# Patient Record
Sex: MALE | Race: WHITE | NOT HISPANIC OR LATINO | Employment: FULL TIME | URBAN - NONMETROPOLITAN AREA
[De-identification: names, ages, dates, MRNs, and addresses within clinical notes are randomized per-mention and may not be internally consistent; named-entity substitution may affect disease eponyms.]

---

## 2021-04-12 ENCOUNTER — APPOINTMENT (EMERGENCY)
Dept: CT IMAGING | Facility: HOSPITAL | Age: 56
End: 2021-04-12
Payer: COMMERCIAL

## 2021-04-12 ENCOUNTER — HOSPITAL ENCOUNTER (EMERGENCY)
Facility: HOSPITAL | Age: 56
Discharge: HOME | End: 2021-04-12
Attending: EMERGENCY MEDICINE | Admitting: EMERGENCY MEDICINE
Payer: COMMERCIAL

## 2021-04-12 VITALS
BODY MASS INDEX: 19.2 KG/M2 | TEMPERATURE: 97.3 F | OXYGEN SATURATION: 99 % | HEART RATE: 85 BPM | WEIGHT: 119.49 LBS | DIASTOLIC BLOOD PRESSURE: 68 MMHG | RESPIRATION RATE: 16 BRPM | SYSTOLIC BLOOD PRESSURE: 146 MMHG | HEIGHT: 66 IN

## 2021-04-12 DIAGNOSIS — D58.2 ELEVATED HEMOGLOBIN (HCC): ICD-10-CM

## 2021-04-12 DIAGNOSIS — L03.211 FACIAL CELLULITIS: ICD-10-CM

## 2021-04-12 DIAGNOSIS — K04.7 DENTAL ABSCESS: Primary | ICD-10-CM

## 2021-04-12 DIAGNOSIS — K02.9 DENTAL CARIES: ICD-10-CM

## 2021-04-12 LAB
ALBUMIN SERPL BCP-MCNC: 4.1 G/DL (ref 3.5–5)
ALP SERPL-CCNC: 126 U/L (ref 46–116)
ALT SERPL W P-5'-P-CCNC: 27 U/L (ref 12–78)
ANION GAP SERPL CALCULATED.3IONS-SCNC: 11 MMOL/L (ref 4–13)
AST SERPL W P-5'-P-CCNC: 25 U/L (ref 5–45)
BASOPHILS # BLD AUTO: 0.11 THOUSANDS/ΜL (ref 0–0.1)
BASOPHILS NFR BLD AUTO: 1 % (ref 0–1)
BILIRUB SERPL-MCNC: 1.01 MG/DL (ref 0.2–1)
BUN SERPL-MCNC: 13 MG/DL (ref 5–25)
CALCIUM SERPL-MCNC: 9.9 MG/DL (ref 8.3–10.1)
CHLORIDE SERPL-SCNC: 97 MMOL/L (ref 100–108)
CO2 SERPL-SCNC: 26 MMOL/L (ref 21–32)
CREAT SERPL-MCNC: 1.19 MG/DL (ref 0.6–1.3)
EOSINOPHIL # BLD AUTO: 0.05 THOUSAND/ΜL (ref 0–0.61)
EOSINOPHIL NFR BLD AUTO: 0 % (ref 0–6)
ERYTHROCYTE [DISTWIDTH] IN BLOOD BY AUTOMATED COUNT: 13.2 % (ref 11.6–15.1)
GFR SERPL CREATININE-BSD FRML MDRD: 68 ML/MIN/1.73SQ M
GLUCOSE SERPL-MCNC: 88 MG/DL (ref 65–140)
HCT VFR BLD AUTO: 54.3 % (ref 36.5–49.3)
HGB BLD-MCNC: 18.8 G/DL (ref 12–17)
IMM GRANULOCYTES # BLD AUTO: 0.04 THOUSAND/UL (ref 0–0.2)
IMM GRANULOCYTES NFR BLD AUTO: 0 % (ref 0–2)
LYMPHOCYTES # BLD AUTO: 1.88 THOUSANDS/ΜL (ref 0.6–4.47)
LYMPHOCYTES NFR BLD AUTO: 14 % (ref 14–44)
MCH RBC QN AUTO: 34.7 PG (ref 26.8–34.3)
MCHC RBC AUTO-ENTMCNC: 34.6 G/DL (ref 31.4–37.4)
MCV RBC AUTO: 100 FL (ref 82–98)
MONOCYTES # BLD AUTO: 0.8 THOUSAND/ΜL (ref 0.17–1.22)
MONOCYTES NFR BLD AUTO: 6 % (ref 4–12)
NEUTROPHILS # BLD AUTO: 10.89 THOUSANDS/ΜL (ref 1.85–7.62)
NEUTS SEG NFR BLD AUTO: 79 % (ref 43–75)
NRBC BLD AUTO-RTO: 0 /100 WBCS
PLATELET # BLD AUTO: 173 THOUSANDS/UL (ref 149–390)
PMV BLD AUTO: 9.5 FL (ref 8.9–12.7)
POTASSIUM SERPL-SCNC: 3.8 MMOL/L (ref 3.5–5.3)
PROT SERPL-MCNC: 8 G/DL (ref 6.4–8.2)
RBC # BLD AUTO: 5.42 MILLION/UL (ref 3.88–5.62)
SODIUM SERPL-SCNC: 134 MMOL/L (ref 136–145)
WBC # BLD AUTO: 13.77 THOUSAND/UL (ref 4.31–10.16)

## 2021-04-12 PROCEDURE — 36415 COLL VENOUS BLD VENIPUNCTURE: CPT | Performed by: EMERGENCY MEDICINE

## 2021-04-12 PROCEDURE — 99284 EMERGENCY DEPT VISIT MOD MDM: CPT | Performed by: EMERGENCY MEDICINE

## 2021-04-12 PROCEDURE — 99284 EMERGENCY DEPT VISIT MOD MDM: CPT

## 2021-04-12 PROCEDURE — 70487 CT MAXILLOFACIAL W/DYE: CPT

## 2021-04-12 PROCEDURE — 85025 COMPLETE CBC W/AUTO DIFF WBC: CPT | Performed by: EMERGENCY MEDICINE

## 2021-04-12 PROCEDURE — 80053 COMPREHEN METABOLIC PANEL: CPT | Performed by: EMERGENCY MEDICINE

## 2021-04-12 RX ORDER — CHLORHEXIDINE GLUCONATE 0.12 MG/ML
15 RINSE ORAL 2 TIMES DAILY
Qty: 473 ML | Refills: 1 | Status: SHIPPED | OUTPATIENT
Start: 2021-04-12

## 2021-04-12 RX ORDER — CLINDAMYCIN HYDROCHLORIDE 150 MG/1
300 CAPSULE ORAL ONCE
Status: COMPLETED | OUTPATIENT
Start: 2021-04-12 | End: 2021-04-12

## 2021-04-12 RX ORDER — CLINDAMYCIN HYDROCHLORIDE 300 MG/1
300 CAPSULE ORAL 4 TIMES DAILY
Qty: 28 CAPSULE | Refills: 0 | Status: SHIPPED | OUTPATIENT
Start: 2021-04-12 | End: 2021-04-19

## 2021-04-12 RX ORDER — LIDOCAINE HYDROCHLORIDE AND EPINEPHRINE 10; 10 MG/ML; UG/ML
10 INJECTION, SOLUTION INFILTRATION; PERINEURAL ONCE
Status: COMPLETED | OUTPATIENT
Start: 2021-04-12 | End: 2021-04-12

## 2021-04-12 RX ADMIN — CLINDAMYCIN HYDROCHLORIDE 300 MG: 150 CAPSULE ORAL at 08:58

## 2021-04-12 RX ADMIN — LIDOCAINE HYDROCHLORIDE,EPINEPHRINE BITARTRATE 10 ML: 10; .01 INJECTION, SOLUTION INFILTRATION; PERINEURAL at 08:44

## 2021-04-12 RX ADMIN — IOHEXOL 100 ML: 350 INJECTION, SOLUTION INTRAVENOUS at 07:48

## 2021-04-12 NOTE — ED CARE HANDOFF
Emergency Department Sign Out Note        Sign out and transfer of care from Dr Ava Henderson  See Separate Emergency Department note  The patient, Lea Vela, was evaluated by Dr Ava Henderson for left-sided facial swelling, erythema, and pain  Workup Completed:  No workup completed at the time of sign-out  ED Course / Workup Pending (followup):  CBC, CMP, and CT facial bones with contrast pending  ED Course as of Apr 12 0913   Mon Apr 12, 2021   0718 WBC(!): 13 77   0718 Unclear baseline  Hemoglobin(!): 18 8   0757 Updated patient regarding lab results  He did admit to smoking around a pack of cigarettes per day but was not aware having elevated hemoglobin in the past   Recommended PCP follow-up and repeat labs in a few weeks  8255 Reviewed CT  There appears to be in odontogenic abscess in the left maxillary region  Patient denied any oral pain  I did examine the patient's teeth  There was swelling around the left maxillary premolars suggestive of dental abscess  Dentition was poor with some dental caries noted  Will wait for formal Radiology read  The patient may need an incision and drainage  2488 Odontogenic soft tissue abscess along the buccal surface of diseased left maxillary canine and first premolar teeth measuring 1 8 x 0 7 x 2 1 cm  Overlying left facial cellulitis involving left orbit preseptal soft tissue  No post septal   inflammation  CT facial bones with contrast   1562 Updated patient regarding CT findings  Explained plan to drain the dental abscess  Patient reported an allergy to penicillin but was not able to recall what this allergy was  He stated that happened when he was a child  Out of abundance of caution, will start the patient on clindamycin instead of Augmentin  He will need close outpatient follow-up with his PCP and dentist       0957 Performed incision and drainage of the dental abscess    Moderate amount of purulent material was expressed  Swelling of the periapical region improved significantly, nearly resolved  Had a long discussion with the patient regarding his diagnosis and the need for follow-up with both his PCP and a dentist as soon as possible  Emphasized the importance of taking the antibiotics as prescribed  Discussed return precautions with the patient at length  He verbalized understanding of all instructions  Incision and drain    Date/Time: 4/12/2021 8:54 AM  Performed by: Mechelle Dalal MD  Authorized by: Mechelle Dalal MD   Universal Protocol:  Consent: Verbal consent obtained  Risks and benefits: risks, benefits and alternatives were discussed  Consent given by: patient  Time out: Immediately prior to procedure a "time out" was called to verify the correct patient, procedure, equipment, support staff and site/side marked as required  Patient understanding: patient states understanding of the procedure being performed  Patient consent: the patient's understanding of the procedure matches consent given  Procedure consent: procedure consent matches procedure scheduled  Site marked: the operative site was marked  Radiology Images displayed and confirmed  If images not available, report reviewed: imaging studies available  Patient identity confirmed: verbally with patient and arm band      Patient location:  ED  Location:     Type:  Abscess    Size:  1 8 x 0 7 x 2 1 cm    Location:  Mouth    Location Detail:  Intraoral  Anesthesia (see MAR for exact dosages): Anesthesia method:  Local infiltration    Local anesthetic:  Lidocaine 1% WITH epi  Procedure details:     Complexity:  Simple    Needle aspiration: no      Incision types:  Single straight    Scalpel blade:  10    Approach:  Open    Incision depth:  Submucosal    Wound management:  Probed and deloculated    Drainage:  Purulent    Drainage amount:   Moderate    Wound treatment:  Wound left open    Packing materials: None  Post-procedure details:     Patient tolerance of procedure: Tolerated well, no immediate complications      MDM  Number of Diagnoses or Management Options  Dental abscess: new and requires workup  Dental caries: established and worsening  Elevated hemoglobin ([address] ): new and requires workup  Facial cellulitis: new and requires workup  Diagnosis management comments:     Patient seen and examined immediately after sign out  Briefly, patient is a 59-year-old male with a documented past medical history of peripheral arterial disease, hypertension, and hyperlipidemia  There are few medical records available in the EMR  Patient reported that he noticed some swelling of the left side of his face yesterday which progressively worsened  When he woke up today, he noted swelling of his face involving the left lower eyelid and left upper cheek  There was associated erythema and pain  Patient decided to seek evaluation at that time  Patient reported discomfort and swelling only in the above-noted area  He denied any swelling of the rest of his face or neck  No vision changes such as blurred vision, double vision, or loss of vision  Patient denied any ocular pain or a foreign body sensation in the left eye  No fever, chills, URI symptoms, cough, chest pain, or shortness of breath  No dental pain reported  On examination, the patient had swelling of the left lower eyelid and left upper cheek  There was associated erythema and tenderness to palpation  No crepitus  Patient had normal appearing conjunctiva on the left  Both pupils were equal and reactive to light  Extraocular movements or intact  Patient had no pain with extraocular movements or ophthalmoplegia  No proptosis  History and physical examination most consistent with periorbital cellulitis or odontogenic facial cellulitis  No findings to suggest orbital cellulitis  Dr Akhil Eavns had ordered CBC, CMP, and CT facial bones    CBC demonstrated mild leukocytosis, nonspecific  Hemoglobin was elevated 18 8 but the patient had no prior CBCs available for comparison, so it is not clear whether this represents his baseline  It should be worked up further as an outpatient  CT was personally reviewed  Demonstrated an abscess immediately adjacent to the left maxillary incisors and premolars, concerning for odontogenic abscess with osteogenic facial cellulitis  Although the patient denied dental pain, I examined his mouth and he did have swelling adjacent to the left maxillary incisors in premolars consistent with periapical abscess  Radiology read agreed with my interpretation  Performed incision and drainage as documented above  Patient tolerated this well  Significant purulent drainage was obtained  The periapical swelling had nearly completely resolved after incision and drainage  Patient was started on clindamycin  He was prescribed a 1 week course  He was also prescribed Peridex mouthwash  Patient was advised to follow up with his PCP within 1 week for a recheck  He was also told to follow-up regarding the elevated hemoglobin  Patient was also given the information for multiple local dental clinics and told to follow-up with a dentist as soon as possible for further evaluation and treatment of his significant dental disease  The patient was given detailed return precautions both verbally and in writing  All instructions were personally reviewed with the patient at length  I answered all the patient's questions  He verbalized understanding of all the instructions provided as well as the return precautions provided         Amount and/or Complexity of Data Reviewed  Clinical lab tests: reviewed  Tests in the radiology section of CPT®: reviewed  Decide to obtain previous medical records or to obtain history from someone other than the patient: yes  Review and summarize past medical records: yes  Discuss the patient with other providers: yes  Independent visualization of images, tracings, or specimens: yes    Risk of Complications, Morbidity, and/or Mortality  Presenting problems: moderate  Diagnostic procedures: minimal  Management options: minimal    Patient Progress  Patient progress: improved      Disposition  Final diagnoses:   None     ED Disposition     None      Follow-up Information    None       Patient's Medications    No medications on file     No discharge procedures on file         ED Provider  Electronically Signed by     Milena Collins MD  04/12/21 4638

## 2021-04-12 NOTE — Clinical Note
Angeles Nash was seen and treated in our emergency department on 4/12/2021  Other - See Comments    No limitations  Diagnosis: Not included due to HIPAA  Nidia Berumen  may return to work on return date  He may return on this date: 04/14/2021    Please excuse Nidia Berumen for any necessary follow-up appointments  I recommended that he follow up with his PCP within 1 week and a dentist as soon as possible  Call with any questions  If you have any questions or concerns, please don't hesitate to call        Omid Garcia MD    ______________________________           _______________          _______________  Hospital Representative                              Date                                Time

## 2021-04-12 NOTE — ED PROVIDER NOTES
History  Chief Complaint   Patient presents with    Facial Swelling     L-side of face swelling, onset yesterday afternoon  HPI  51-year-old male comes in with left sided I swelling and facial swelling  Patient states that yesterday afternoon, he started with some swelling of his left thigh  He denies any trauma  Then he went to bed  He has had progressive pain in his left face  Then when he woke up today, the left side his face was swollen  He denies any trismus, he denies any neck pain denies any trouble swallowing  Denies any vision changes, he has pain around his left eye but denies any pain with movement of his left eye  States has never happened before  Denies any new drugs or foods  Denies any insect bites  Denies any history of diabetes  None       Past Medical History:   Diagnosis Date    Gastroesophageal reflux     Hyperlipidemia     last assessed: 2/27/2019    Hypertension     last assessed: 2/27/2019    [facility] )     last assessed: 2/27/2019       History reviewed  No pertinent surgical history  History reviewed  No pertinent family history  I have reviewed and agree with the history as documented  E-Cigarette/Vaping    E-Cigarette Use Never User      E-Cigarette/Vaping Substances    Nicotine No     Flavoring No      Social History     Tobacco Use    Smoking status: Current Every Day Smoker     Packs/day: 1 00    Smokeless tobacco: Never Used   Substance Use Topics    Alcohol use: Yes     Frequency: 4 or more times a week     Drinks per session: 1 or 2    Drug use: Never       Review of Systems   Constitutional: Negative  Negative for chills and fever  HENT: Positive for facial swelling  Negative for ear pain and sore throat  Eyes: Negative  Negative for pain and discharge  Respiratory: Negative  Negative for chest tightness and shortness of breath  Cardiovascular: Negative  Negative for chest pain and palpitations  Gastrointestinal: Negative  Negative for abdominal pain, nausea and vomiting  Endocrine: Negative  Negative for polyphagia and polyuria  Genitourinary: Negative  Negative for dysuria and flank pain  Musculoskeletal: Negative  Negative for arthralgias and back pain  Skin: Negative  Negative for color change and wound  Allergic/Immunologic: Negative  Negative for food allergies and immunocompromised state  Neurological: Negative  Negative for weakness and headaches  Hematological: Negative  Negative for adenopathy  Does not bruise/bleed easily  Psychiatric/Behavioral: Negative  Negative for suicidal ideas  The patient is not nervous/anxious  Physical Exam  Physical Exam  Vitals signs and nursing note reviewed  Constitutional:       General: He is not in acute distress  Appearance: He is well-developed  He is not diaphoretic  HENT:      Head: Normocephalic and atraumatic  Right Ear: External ear normal       Left Ear: External ear normal    Eyes:      General: No scleral icterus  Right eye: No discharge  Left eye: No discharge  Extraocular Movements: Extraocular movements intact  Conjunctiva/sclera: Conjunctivae normal       Pupils: Pupils are equal, round, and reactive to light  Comments: Left eye swollen, pupils equal round reactive to life, no proptosis, he has no pain with extraocular movements  Neck:      Musculoskeletal: Normal range of motion and neck supple  Thyroid: No thyromegaly  Trachea: No tracheal deviation  Cardiovascular:      Rate and Rhythm: Normal rate and regular rhythm  Heart sounds: No murmur  No friction rub  No gallop  Pulmonary:      Effort: Pulmonary effort is normal  No respiratory distress  Breath sounds: Normal breath sounds  No stridor  No wheezing or rales  Abdominal:      General: Bowel sounds are normal  There is no distension  Palpations: Abdomen is soft        Tenderness: There is no abdominal tenderness  There is no guarding or rebound  Musculoskeletal: Normal range of motion  General: No deformity  Skin:     General: Skin is warm and dry  Findings: No erythema or rash  Neurological:      Mental Status: He is alert and oriented to person, place, and time  Cranial Nerves: No cranial nerve deficit  Psychiatric:         Behavior: Behavior normal          Thought Content:  Thought content normal          Vital Signs  ED Triage Vitals   Temperature Pulse Respirations Blood Pressure SpO2   04/12/21 0638 04/12/21 0638 04/12/21 0638 04/12/21 0638 04/12/21 0638   (!) 97 3 °F (36 3 °C) 75 18 139/91 98 %      Temp Source Heart Rate Source Patient Position - Orthostatic VS BP Location FiO2 (%)   04/12/21 0638 04/12/21 0638 04/12/21 0638 04/12/21 0638 --   Temporal Monitor Sitting Left arm       Pain Score       04/12/21 0831       No Pain           Vitals:    04/12/21 0638 04/12/21 0730 04/12/21 0831 04/12/21 0907   BP: 139/91 131/87 142/78 146/68   Pulse: 75 86 84 85   Patient Position - Orthostatic VS: Sitting Sitting Sitting Sitting         Visual Acuity      ED Medications  Medications   iohexol (OMNIPAQUE) 350 MG/ML injection (SINGLE-DOSE) 100 mL (100 mL Intravenous Given 4/12/21 0748)   lidocaine-epinephrine (XYLOCAINE/EPINEPHRINE) 1 %-1:100,000 injection 10 mL (10 mL Infiltration Given by Other 4/12/21 0844)   clindamycin (CLEOCIN) capsule 300 mg (300 mg Oral Given 4/12/21 0858)       Diagnostic Studies  Results Reviewed     Procedure Component Value Units Date/Time    Comprehensive metabolic panel [[id number]]  (Abnormal) Collected: 04/12/21 0703    Lab Status: Final result Specimen: Blood from Arm, Right Updated: 04/12/21 0725     Sodium 134 mmol/L      Potassium 3 8 mmol/L      Chloride 97 mmol/L      CO2 26 mmol/L      ANION GAP 11 mmol/L      BUN 13 mg/dL      Creatinine 1 19 mg/dL      Glucose 88 mg/dL      Calcium 9 9 mg/dL      AST 25 U/L      ALT 27 U/L Alkaline Phosphatase 126 U/L      Total Protein 8 0 g/dL      Albumin 4 1 g/dL      Total Bilirubin 1 01 mg/dL      eGFR 68 ml/min/1 73sq m     Narrative:      [place] guidelines for Chronic Kidney Disease (CKD):     Stage 1 with normal or high GFR (GFR > 90 mL/min/1 73 square meters)    Stage 2 Mild CKD (GFR = 60-89 mL/min/1 73 square meters)    Stage 3A Moderate CKD (GFR = 45-59 mL/min/1 73 square meters)    Stage 3B Moderate CKD (GFR = 30-44 mL/min/1 73 square meters)    Stage 4 Severe CKD (GFR = 15-29 mL/min/1 73 square meters)    Stage 5 End Stage CKD (GFR <15 mL/min/1 73 square meters)  Note: GFR calculation is accurate only with a steady state creatinine    CBC and differential [[id number]]  (Abnormal) Collected: 04/12/21 0703    Lab Status: Final result Specimen: Blood from Arm, Right Updated: 04/12/21 0709     WBC 13 77 Thousand/uL      RBC 5 42 Million/uL      Hemoglobin 18 8 g/dL      Hematocrit 54 3 %       fL      MCH 34 7 pg      MCHC 34 6 g/dL      RDW 13 2 %      MPV 9 5 fL      Platelets 790 Thousands/uL      nRBC 0 /100 WBCs      Neutrophils Relative 79 %      Immat GRANS % 0 %      Lymphocytes Relative 14 %      Monocytes Relative 6 %      Eosinophils Relative 0 %      Basophils Relative 1 %      Neutrophils Absolute 10 89 Thousands/µL      Immature Grans Absolute 0 04 Thousand/uL      Lymphocytes Absolute 1 88 Thousands/µL      Monocytes Absolute 0 80 Thousand/µL      Eosinophils Absolute 0 05 Thousand/µL      Basophils Absolute 0 11 Thousands/µL                  CT facial bones with contrast   Final Result by Babar Her MD (04/12 0820)      1  Odontogenic soft tissue abscess along the buccal surface of diseased left maxillary canine and first premolar teeth measuring 1 8 x 0 7 x 2 1 cm  Overlying left facial cellulitis involving left orbit preseptal soft tissue  No post septal    inflammation        2   Multiple carious teeth with periapical lucencies/abscess of bilateral maxillary first premolar teeth  Clinical assessment is advised  The study was marked in [facility] for immediate notification  Workstation performed: [id number]                    Procedures  Procedures         ED Course                             SBIRT 22yo+      Most Recent Value   SBIRT (22 yo +)   In order to provide better care to our patients, we are screening all of our patients for alcohol and drug use  Would it be okay to ask you these screening questions? Yes Filed at: 04/12/2021 5460   Initial Alcohol Screen: US AUDIT-C    1  How often do you have a drink containing alcohol?  0 Filed at: 04/12/2021 0709   2  How many drinks containing alcohol do you have on a typical day you are drinking? 0 Filed at: 04/12/2021 0709   3a  Male UNDER 65: How often do you have five or more drinks on one occasion? 0 Filed at: 04/12/2021 0709   Audit-C Score  0 Filed at: 04/12/2021 9090   ROYAL: How many times in the past year have you    Used an illegal drug or used a prescription medication for non-medical reasons? Never Filed at: 04/12/2021 0709                    [facility]  Number of Diagnoses or Management Options  Diagnosis management comments: 80-year-old man comes in with progressive worsening facial swelling  No evidence of airway involvement  He is having pain with this swelling, concern for infection  Reassuring that he does not have proptosis or pain with extraocular movement  Given the progressive nature of the swelling, will get a CT of his face  Likely discharge with antibiotics        Disposition  Final diagnoses:   Dental abscess   Facial cellulitis   Dental caries   Elevated hemoglobin ([facility] [address] )     Time reflects when diagnosis was documented in both MDM as applicable and the Disposition within this note     Time User Action Codes Description Comment    4/12/2021  8:56 AM Darshan Eagle Add [K04 7] Dental abscess     4/12/2021  8:56 AM Daniela Vera Massimo Dayana Add [A93 371] Facial cellulitis     4/12/2021  8:57 AM Candice Armstrong Add [K02 9] Dental caries     4/12/2021  8:58 AM Candice Armstrong Add [D58 2] Elevated hemoglobin [facility])       ED Disposition     ED Disposition Condition Date/Time Comment    Discharge Good Mon Apr 12, 2021  8:56 AM Israel Williamson discharge to home/self care  Follow-up Information     Follow up With Specialties Details Why Contact Info Additional Information    John Ceron DO Family Medicine Call in 1 day Follow-up with your PCP in 1 week for a recheck  Also get repeat labs due to the elevated hemoglobin  [address]       [facility] Emergency Department Emergency Medicine Go to  If symptoms worsen  [place] [id number]  [facility] Emergency Department, [address]  Call today Please call and follow up with a dentist as soon as possible  [address] [phone]   Call today Please call and follow-up with a dentist as soon as possible  [address] [place] [address]  [phone]           Discharge Medication List as of 4/12/2021  9:04 AM      START taking these medications    Details   chlorhexidine (PERIDEX) 0 12 % solution Apply 15 mL to the mouth or throat 2 (two) times a day, Starting Mon 4/12/2021, Normal      clindamycin (CLEOCIN) 300 MG capsule Take 1 capsule (300 mg total) by mouth 4 (four) times a day for 7 days, Starting Mon 4/12/2021, Until Mon 4/19/2021, Normal           No discharge procedures on file      PDMP Review     None          ED Provider  Electronically Signed by           Michael Denney MD  04/12/21 1334

## 2021-04-12 NOTE — DISCHARGE INSTRUCTIONS
As we discussed, the swelling and pain of your face were due to a dental abscess  This abscess began to cause cellulitis (a skin and soft tissue infection) of the face  I drained the dental abscess and your symptoms should start to get better after 1-2 days of antibiotics  Follow-up with your PCP within 1 week  You should get a recheck of the facial swelling and infection  You should also discuss getting repeat labs due to your elevated hemoglobin  It is very important that you follow-up with a dentist as soon as possible  Start calling dentists today  You have numerous cavities and the dental abscess indicates significant disease of your teeth  Refer to the list of dental clinics I provided  I also gave you the information for [address] [place] [address] dental clinics as well  Take clindamycin as prescribed  Use the Peridex mouthwash as prescribed  Closely monitor your symptoms at home  Return to the ER with fever, shaking chills, blurred vision, double vision, loss of vision in your left eye, rapidly worsening facial swelling, worsening or unchanged facial swelling or redness after 2 full days of antibiotics, difficulty opening your mouth, difficulty swallowing, swelling underneath your jawline or chin, difficulty breathing, neck swelling, or any other concerning symptoms

## 2021-04-12 NOTE — ED NOTES
L-cheek, below left eye swollen  Denies pain of discomfort to these areas        Haley Mendosa RN  04/12/21 [facility], RN  04/12/21 5522

## 2021-04-12 NOTE — Clinical Note
Janineaustin Catalina was seen and treated in our emergency department on 4/12/2021  Other - See Comments    No limitations  Diagnosis: Not included due to HIPAA  Liseth Cabrera  may return to work on return date  He may return on this date: 04/14/2021    Please excuse Liseth Cabrera for any necessary follow-up appointments  I recommended that he follow up with his PCP within 1 week and a dentist as soon as possible  Call with any questions  If you have any questions or concerns, please don't hesitate to call        Clemente Couch MD    ______________________________           _______________          _______________  Hospital Representative                              Date                                Time

## 2021-04-21 ENCOUNTER — OFFICE VISIT (OUTPATIENT)
Dept: FAMILY MEDICINE CLINIC | Facility: CLINIC | Age: 56
End: 2021-04-21
Payer: COMMERCIAL

## 2021-04-21 VITALS
HEART RATE: 78 BPM | DIASTOLIC BLOOD PRESSURE: 82 MMHG | SYSTOLIC BLOOD PRESSURE: 140 MMHG | TEMPERATURE: 97 F | BODY MASS INDEX: 18.71 KG/M2 | OXYGEN SATURATION: 99 % | HEIGHT: 66 IN | WEIGHT: 116.4 LBS

## 2021-04-21 DIAGNOSIS — E78.5 DYSLIPIDEMIA: ICD-10-CM

## 2021-04-21 DIAGNOSIS — Z12.5 PROSTATE CANCER SCREENING: ICD-10-CM

## 2021-04-21 DIAGNOSIS — Z11.59 ENCOUNTER FOR HEPATITIS C SCREENING TEST FOR LOW RISK PATIENT: ICD-10-CM

## 2021-04-21 DIAGNOSIS — K04.7 DENTAL ABSCESS: ICD-10-CM

## 2021-04-21 DIAGNOSIS — Z00.00 ANNUAL PHYSICAL EXAM: Primary | ICD-10-CM

## 2021-04-21 DIAGNOSIS — Z12.11 COLON CANCER SCREENING: ICD-10-CM

## 2021-04-21 PROCEDURE — 3008F BODY MASS INDEX DOCD: CPT | Performed by: FAMILY MEDICINE

## 2021-04-21 PROCEDURE — 3725F SCREEN DEPRESSION PERFORMED: CPT | Performed by: FAMILY MEDICINE

## 2021-04-21 PROCEDURE — 99396 PREV VISIT EST AGE 40-64: CPT | Performed by: FAMILY MEDICINE

## 2021-04-21 RX ORDER — ASPIRIN 325 MG
325 TABLET ORAL DAILY
COMMUNITY

## 2021-04-21 RX ORDER — ROSUVASTATIN CALCIUM 20 MG/1
20 TABLET, COATED ORAL DAILY
Qty: 90 TABLET | Refills: 3 | Status: SHIPPED | OUTPATIENT
Start: 2021-04-21

## 2021-04-21 NOTE — PATIENT INSTRUCTIONS
Cigarette Smoking and Your Health   AMBULATORY CARE:   Risks to your health if you smoke:  Nicotine and other chemicals found in tobacco and e-cigarettes can damage every cell in your body  Even if you are a light smoker, you have an increased risk for cancer, heart disease, and lung disease  If you are pregnant or have diabetes, smoking increases your risk for complications  Nicotine can affect an adolescent's developing brain  This can lead to trouble thinking, learning, or paying attention  Benefits to your health if you stop smoking:   · You decrease respiratory symptoms such as coughing, wheezing, and shortness of breath  · You reduce your risk for cancers of the lung, mouth, throat, kidney, bladder, pancreas, stomach, and cervix  If you already have cancer, you increase the benefits of chemotherapy  You also reduce your risk for cancer returning or a second cancer from developing  · You reduce your risk for heart disease, blood clots, heart attack, and stroke  · You reduce your risk for lung infections, and diseases such as pneumonia, asthma, chronic bronchitis, and emphysema  · Your circulation improves  More oxygen can be delivered to your body  If you have diabetes, you lower your risk for complications, such as kidney, artery, and eye diseases  You also lower your risk for nerve damage  Nerve damage can lead to amputations, poor vision, and blindness  · You improve your body's ability to heal and to fight infections  · An adolescent can help his or her brain and body develop in a healthy way  Talk to your adolescent about all the health risks of nicotine  If you can, start talking about nicotine when your child is younger than 12 years  This may make it easier for him or her not to start using nicotine as a teenager or adult  Explain to him or her that it is best never to start  It can be hard to try to quit later      Benefits to the health of others if you stop smoking:  Tobacco is harmful to nonsmokers who breathe in your secondhand smoke  The following are ways the health of others around you may improve when you stop smoking:  · You lower the risks for lung cancer and heart disease in nonsmoking adults  · If you are pregnant, you lower the risk for miscarriage, early delivery, low birth weight, and stillbirth  You also lower your baby's risk for SIDS, obesity, developmental delay, and neurobehavioral problems, such as ADHD  · If you have children, you lower their risk for ear infections, colds, pneumonia, bronchitis, and asthma  Follow up with your doctor as directed:  Write down your questions so you remember to ask them during your visits  For more information and support to stop smoking:   · [place]  Phone: 7- 973 - 259-9474  Web Address: www [employer]  Debi [address] Information is for End User's use only and may not be sold, redistributed or otherwise used for commercial purposes  All illustrations and images included in CareNotes® are the copyrighted property of A [employer] Inc  or [facility] Raisa Nagel   The above information is an  only  It is not intended as medical advice for individual conditions or treatments  Talk to your doctor, nurse or pharmacist before following any medical regimen to see if it is safe and effective for you

## 2021-04-21 NOTE — ASSESSMENT & PLAN NOTE
Patient presented to the office today for his annual physical exam   He has not been seen by me in years  I counseled the patient at length regarding the importance of smoking cessation  We discussed his cardiovascular risk  He needs to be on a statin  He should continue aspirin indefinitely  I ordered a fasting lipid panel  I started him on rosuvastatin 20 mg daily  He has not had a PSA in years  A screening PSA was ordered  I explained to him that this is an annual test that should be done on men over the age of 48  I recommended screening colonoscopy but the patient declined  I discussed options and he was agreeable to a fit test   We discussed follow-up with a dentist or oral surgeon  I told the patient if he does not have his teeth taken care of, he is going to have recurrent dental abscesses  He promises me he will make an appointment to see Dr Gilford Lana

## 2021-04-21 NOTE — PROGRESS NOTES
Assessment/Plan:    Annual physical exam   Patient presented to the office today for his annual physical exam   He has not been seen by me in years  I counseled the patient at length regarding the importance of smoking cessation  We discussed his cardiovascular risk  He needs to be on a statin  He should continue aspirin indefinitely  I ordered a fasting lipid panel  I started him on rosuvastatin 20 mg daily  He has not had a PSA in years  A screening PSA was ordered  I explained to him that this is an annual test that should be done on men over the age of 48  I recommended screening colonoscopy but the patient declined  I discussed options and he was agreeable to a fit test   We discussed follow-up with a dentist or oral surgeon  I told the patient if he does not have his teeth taken care of, he is going to have recurrent dental abscesses  He promises me he will make an appointment to see Dr Ramesh Bloom  Diagnoses and all orders for this visit:    Annual physical exam    Colon cancer screening  -     Occult Blood, Fecal, IA; Future    Prostate cancer screening  -     PSA, Total Screen; Future    Dyslipidemia  -     Lipid panel; Future  -     rosuvastatin (CRESTOR) 20 MG tablet; Take 1 tablet (20 mg total) by mouth daily    Dental abscess  -     CBC and differential; Future    Encounter for hepatitis C screening test for low risk patient  -     Hepatitis C antibody; Future    Other orders  -     aspirin 325 mg tablet; Take 325 mg by mouth daily           Subjective:      Patient ID: Farhana Le is a 54 y o  male  This patient is a 78-year-old white male who presents to the office today for his annual physical as well as for a follow-up from the emergency department  I have not seen this patient for years  He has known history of peripheral arterial disease and hyperlipidemia  He has not taking his cholesterol medication for years  He tells me he feels well and did not think he needs it    He has not followed up with his vascular surgeon  Tells me he has no symptoms and did not think it was necessary  He continues to smoke cigarettes despite numerous warnings to quit  He reports he had facial swelling of his left upper face  It was determined he had a dental abscess  He had incision and drainage done on his gum  He was started on antibiotics  He has not scheduled an appointment to see a dentist at this time  The following portions of the patient's history were reviewed and updated as appropriate: allergies, current medications, past family history, past medical history, past social history, past surgical history and problem list     Review of Systems   HENT: Positive for hearing loss  Denies toothache   Respiratory: Negative for cough and shortness of breath  Cardiovascular: Negative for chest pain, palpitations and leg swelling  Denies intermittent claudication   Gastrointestinal: Negative for abdominal distention, abdominal pain, blood in stool, constipation, diarrhea and nausea  Objective:      /82 (BP Location: Left arm, Patient Position: Sitting, Cuff Size: Adult)   Pulse 78   Temp (!) 97 °F (36 1 °C) (Temporal)   Ht 5' 6" (1 676 m)   Wt 52 8 kg (116 lb 6 4 oz)   SpO2 99%   BMI 18 79 kg/m²          Physical Exam  Vitals signs reviewed  HENT:      Head: Normocephalic and atraumatic  Comments: No facial swelling was appreciated     Right Ear: Tympanic membrane, ear canal and external ear normal  There is no impacted cerumen  Left Ear: Tympanic membrane, ear canal and external ear normal  There is no impacted cerumen  Mouth/Throat:      Mouth: Mucous membranes are moist       Pharynx: Oropharynx is clear  No oropharyngeal exudate or posterior oropharyngeal erythema  Comments: Multiple dental caries were noted  No swelling of the gum  Incision is healing well  Eyes:      General: No scleral icterus  Right eye: No discharge  Left eye: No discharge  Conjunctiva/sclera: Conjunctivae normal       Pupils: Pupils are equal, round, and reactive to light  Neck:      Vascular: No carotid bruit  Comments: There is no thyromegaly  Cardiovascular:      Rate and Rhythm: Normal rate and regular rhythm  Heart sounds: Normal heart sounds  No murmur  No friction rub  No gallop  Pulmonary:      Effort: Pulmonary effort is normal  No respiratory distress  Breath sounds: Normal breath sounds  No stridor  No wheezing, rhonchi or rales  Abdominal:      General: Bowel sounds are normal  There is no distension  Palpations: Abdomen is soft  There is no mass  Tenderness: There is no abdominal tenderness  There is no guarding  Hernia: No hernia is present  Lymphadenopathy:      Cervical: No cervical adenopathy  Skin:     Capillary Refill: Capillary refill takes less than 2 seconds  Psychiatric:         Mood and Affect: Mood normal          Behavior: Behavior normal          Thought Content: Thought content normal          Judgment: Judgment normal          Extremities:  Feet and legs are warm to the touch with no skin mottling  The dorsalis pedis and posterior tibial pulses are + 2 bilaterally    There is no cyanosis, clubbing, or edema

## 2021-04-24 ENCOUNTER — APPOINTMENT (OUTPATIENT)
Dept: LAB | Facility: HOSPITAL | Age: 56
End: 2021-04-24
Attending: FAMILY MEDICINE
Payer: COMMERCIAL

## 2021-04-24 DIAGNOSIS — K04.7 DENTAL ABSCESS: ICD-10-CM

## 2021-04-24 DIAGNOSIS — Z11.59 ENCOUNTER FOR HEPATITIS C SCREENING TEST FOR LOW RISK PATIENT: ICD-10-CM

## 2021-04-24 DIAGNOSIS — Z12.5 PROSTATE CANCER SCREENING: ICD-10-CM

## 2021-04-24 DIAGNOSIS — E78.5 DYSLIPIDEMIA: ICD-10-CM

## 2021-04-24 LAB
BASOPHILS # BLD AUTO: 0.09 THOUSANDS/ΜL (ref 0–0.1)
BASOPHILS NFR BLD AUTO: 1 % (ref 0–1)
CHOLEST SERPL-MCNC: 168 MG/DL (ref 50–200)
EOSINOPHIL # BLD AUTO: 0.11 THOUSAND/ΜL (ref 0–0.61)
EOSINOPHIL NFR BLD AUTO: 1 % (ref 0–6)
ERYTHROCYTE [DISTWIDTH] IN BLOOD BY AUTOMATED COUNT: 12.8 % (ref 11.6–15.1)
HCT VFR BLD AUTO: 50.7 % (ref 36.5–49.3)
HCV AB SER QL: NORMAL
HDLC SERPL-MCNC: 74 MG/DL
HGB BLD-MCNC: 16.8 G/DL (ref 12–17)
IMM GRANULOCYTES # BLD AUTO: 0.03 THOUSAND/UL (ref 0–0.2)
IMM GRANULOCYTES NFR BLD AUTO: 0 % (ref 0–2)
LDLC SERPL CALC-MCNC: 81 MG/DL (ref 0–100)
LYMPHOCYTES # BLD AUTO: 2.46 THOUSANDS/ΜL (ref 0.6–4.47)
LYMPHOCYTES NFR BLD AUTO: 30 % (ref 14–44)
MCH RBC QN AUTO: 33.9 PG (ref 26.8–34.3)
MCHC RBC AUTO-ENTMCNC: 33.1 G/DL (ref 31.4–37.4)
MCV RBC AUTO: 102 FL (ref 82–98)
MONOCYTES # BLD AUTO: 0.58 THOUSAND/ΜL (ref 0.17–1.22)
MONOCYTES NFR BLD AUTO: 7 % (ref 4–12)
NEUTROPHILS # BLD AUTO: 4.97 THOUSANDS/ΜL (ref 1.85–7.62)
NEUTS SEG NFR BLD AUTO: 61 % (ref 43–75)
NONHDLC SERPL-MCNC: 94 MG/DL
NRBC BLD AUTO-RTO: 0 /100 WBCS
PLATELET # BLD AUTO: 211 THOUSANDS/UL (ref 149–390)
PMV BLD AUTO: 9.7 FL (ref 8.9–12.7)
PSA SERPL-MCNC: 0.5 NG/ML (ref 0–4)
RBC # BLD AUTO: 4.95 MILLION/UL (ref 3.88–5.62)
TRIGL SERPL-MCNC: 67 MG/DL
WBC # BLD AUTO: 8.24 THOUSAND/UL (ref 4.31–10.16)

## 2021-04-24 PROCEDURE — 85025 COMPLETE CBC W/AUTO DIFF WBC: CPT

## 2021-04-24 PROCEDURE — 80061 LIPID PANEL: CPT

## 2021-04-24 PROCEDURE — 36415 COLL VENOUS BLD VENIPUNCTURE: CPT

## 2021-04-24 PROCEDURE — 86803 HEPATITIS C AB TEST: CPT

## 2021-04-24 PROCEDURE — G0103 PSA SCREENING: HCPCS
